# Patient Record
Sex: FEMALE
[De-identification: names, ages, dates, MRNs, and addresses within clinical notes are randomized per-mention and may not be internally consistent; named-entity substitution may affect disease eponyms.]

---

## 2018-04-01 ENCOUNTER — HOSPITAL ENCOUNTER (EMERGENCY)
Dept: HOSPITAL 31 - C.ER | Age: 68
Discharge: HOME | End: 2018-04-01
Payer: MEDICAID

## 2018-04-01 VITALS
TEMPERATURE: 98.1 F | DIASTOLIC BLOOD PRESSURE: 68 MMHG | SYSTOLIC BLOOD PRESSURE: 132 MMHG | RESPIRATION RATE: 18 BRPM | HEART RATE: 74 BPM

## 2018-04-01 VITALS — BODY MASS INDEX: 30.2 KG/M2

## 2018-04-01 VITALS — OXYGEN SATURATION: 98 %

## 2018-04-01 DIAGNOSIS — S09.90XA: Primary | ICD-10-CM
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
## 2018-04-01 DIAGNOSIS — S80.02XA: ICD-10-CM

## 2018-04-01 NOTE — RAD
PROCEDURE:  Left Knee Radiographs.



HISTORY:

Pain.



COMPARISON:

None.



FINDINGS:



BONES:

Normal. No fracture. 



JOINTS:

Tricompartmental degenerative osteoarthritis made with joint space 

narrowing subchondral sclerosis and prominent marginal medial 

osteophyte formation. There may be a small amount of vacuum phenomena 

within lateral compartment left knee 



JOINT EFFUSION:

Suspect trace joint effusion 



OTHER FINDINGS:

None.



IMPRESSION:

No evidence of acute displaced fracture nor dislocation.  

Tricompartmental DJD most notably affecting the medial compartment 

with suspected trace joint effusion.

## 2018-04-01 NOTE — C.PDOC
History Of Present Illness


67 year old female presents to the emergency department for evaluation of a 

head injury and knee injury after sustained mechanical fall early today at 8-9 

AM. Patient reports that she slipped and fell at home, hit her head on a chair. 

She reports a bump on the left side of her head and mild pain on the lateral 

aspect of  left knee with ambulation. Patient denies syncope, headache, 

dizziness, visual changes, focal deficits, neck pain, chest pain, abd. pain, N/V

, denies obvious  deformities, sensory or vascular deficits to B/L UEs and LEs  

extremities. Ambulate to Ed for evaluation, not in any apparent distress.


Time Seen by Provider: 04/01/18 12:10


Chief Complaint (Nursing): Headache


History Per: Patient


History/Exam Limitations: no limitations


Onset/Duration Of Symptoms: Hrs


Quality: Squeezing





Past Medical History


Reviewed: Historical Data, Nursing Documentation, Vital Signs


Vital Signs: 


 Last Vital Signs











Temp  98.1 F   04/01/18 13:20


 


Pulse  74   04/01/18 13:20


 


Resp  18   04/01/18 13:20


 


BP  132/68   04/01/18 13:20


 


Pulse Ox  98   04/01/18 13:37














- Medical History


PMH: Asthma, Diabetes


   Denies: Chronic Kidney Disease


Surgical History: Cholecystectomy


   Denies: Pacemaker





- [facility] Procedures








CARPAL TUNNEL RELEASE (06/26/15)


CORONAR ARTERIOGR-2 CATH (01/16/15)


LEFT HEART CARDIAC CATH (01/16/15)


LT HEART ANGIOCARDIOGRAM (01/16/15)


REMOVAL FB FROM HAND (03/19/14)








Family History: States: No Known Family Hx





- Social History


Hx Tobacco Use: No


Hx Alcohol Use: No


Hx Substance Use: No





- Immunization History


Hx Tetanus Toxoid Vaccination: Yes


Hx Influenza Vaccination: Yes


Hx Pneumococcal Vaccination: No





Review Of Systems


Except As Marked, All Systems Reviewed And Found Negative.


Cardiovascular: Negative for: Chest Pain, Other (headache, syncope)


Musculoskeletal: Negative for: Neck Pain


Skin: Positive for: Other (bumps left side of head)





Physical Exam





- Physical Exam


Appears: Well, Non-toxic, No Acute Distress


Skin: Normal Color, Warm, Dry, No Rash, No Ecchymosis


Head: Normacephalic, Tenderness (mild over left parietal scalp, no palpable 

defomrity.)


Eye(s): bilateral: PERRL, EOMI


Nose: No Flaring, No Discharge


Oral Mucosa: Moist


Neck: Trachea Midline, No Midline Cervical Tenderness, No Paracervical 

Tenderness, No Step Off Deformity, Supple


Chest: Symmetrical, No Deformity


Cardiovascular: Rhythm Regular, No Murmur


Respiratory: No Decreased Breath Sounds, No Accessory Muscle Use, No Stridor, 

No Wheezing


Gastrointestinal/Abdominal: Soft, No Tenderness, No Distention, No Guarding


Back: No CVA Tenderness, No Paraspinal Tenderness


Extremity: Normal ROM (B/L UEs and LEs), Tenderness (over lateral aspect left 

knee. No defomrity. FAROM, no neurovascular deficits.), No Deformity, No 

Swelling


Neurological/Psych: Oriented x3, Normal Speech, Normal Cognition, Normal Motor, 

Normal Sensation, Normal Reflexes





ED Course And Treatment


O2 Sat by Pulse Oximetry: 98


Pulse Ox Interpretation: Normal





- Other Rad


  ** Pelvis w/B;L hips


X-Ray: Interpreted by Me, Viewed By Me


Interpretation: (-) acute fx or dislocation





  ** Left knee


X-Ray: Interpreted by Me, Viewed By Me


Interpretation: (-) acute fx or dislocation





- CT Scan/US


  ** CT head w/o contars


Other Rad Studies (CT/US): Radiology Report Reviewed


CT/US Interpretation: Creator : Iban Whitman MD.  Dictator :   

:  Approver : Iban Whitman MD.  Approver2 :  Report Date : 04/01/2018 12:

54:09.  My Comment :  **********************************************************

*************************.  PROCEDURE:  CT HEAD WITHOUT CONTRAST.  HISTORY:  

injury.  COMPARISON:  None available.  TECHNIQUE:  Axial computed tomography 

images were obtained through the head/brain without intravenous contrast.  

Radiation dose:  Total exam DLP = 847.26 mGy-cm.  This CT exam was performed 

using one or more of the following dose reduction techniques: Automated 

exposure control, adjustment of the mA and/or kV according to patient size, and/

or use of iterative reconstruction technique.  FINDINGS:  HEMORRHAGE:  No acute 

parenchymal, subarachnoid nor extra-axial hemorrhage.  BRAIN:  Suspect minimal 

chronic periventricular white matter ischemic changes.  Mild age-appropriate 

volume loss.  VENTRICLES:  No obstructive hydrocephalus.  CALVARIUM:  

Unremarkable.  PARANASAL SINUSES:  Frontal sinuses are hypoplastic. The 

remaining visualized paranasal sinuses are otherwise relatively well-developed.

  MASTOID AIR CELLS:  Unremarkable as visualized. No inflammatory changes.  

OTHER FINDINGS:  None.  IMPRESSION:  No evidence of acute intracranial 

hemorrhage. Suspect minimal chronic periventricular white matter ischemic 

changes.


Progress Note: On re-eval, pt is afebrile, hemodynamicaly stable.  Non-toxic. 

Ambulatory in ED with stable giat.  Head: NC, mild contusion noted to left 

parietal scalp. No palpable deformity.  neck: Supple, (-) midline tenderness.  

Lungs: CTA B/L, BS equal B/L.  CVS: (+)S1S2, reg.  Abd: benign, (-) guarding, (-

) rebound.  FAROM of B/L UEs and LEs, no deformity, no ecchymoses.  

Neurologicaly intact.  Imaging review and appears normal, w/o acute 

abnormalities.  Pt has clinical findings c/w head injury, left knee contusion s/

p mechanical fall.  Pt advised.  ref. to f/u with PMD in 2-3 days for re-eavl.  

return to ED if any worsening or new changes





Disposition


Counseled Patient/Family Regarding: Studies Performed, Diagnosis, Need For 

Followup





- Disposition


Referrals: 


Kourtney Bocanegra MD [Family Provider] - 


Disposition: HOME/ ROUTINE


Disposition Time: 13:10


Condition: STABLE


Additional Instructions: 


Knee ace wrap for 1 week





OBSERVE 48 HRS FOR ANY SIGN OF HEAD INJURY-INTRACTABLE HEADACHE, VOMITING, 

VISUAL CHANGES OR ANY OTHER NEW CHANGES-RETURN TO ED IMMEDIATELY FOR RE-

EVALUATION.





Follow up with PMD in 1-2 days for re-evaluation.





Instructions:  Knee Sprain (DC), Minor Head Injury (DC)


Forms:  Shoot it! (English)


Print Language: Bengali





- Clinical Impression


Clinical Impression: 


 Head injury, Knee contusion








- PA / NP / Resident Statement


MD/DO has reviewed & agrees with the documentation as recorded.





- Scribe Statement


The provider has reviewed the documentation as recorded by the Scribe (James Hoang)


All medical record entries made by the Scribe were at my direction and 

personally dictated by me. I have reviewed the chart and agree that the record 

accurately reflects my personal performance of the history, physical exam, 

medical decision making, and the department course for this patient. I have 

also personally directed, reviewed, and agree with the discharge instructions 

and disposition.

## 2018-04-01 NOTE — CT
PROCEDURE:  CT HEAD WITHOUT CONTRAST.



HISTORY:

injury



COMPARISON:

None available. 



TECHNIQUE:

Axial computed tomography images were obtained through the head/brain 

without intravenous contrast.  



Radiation dose:



Total exam DLP = 847.26 mGy-cm.



This CT exam was performed using one or more of the following dose 

reduction techniques: Automated exposure control, adjustment of the 

mA and/or kV according to patient size, and/or use of iterative 

reconstruction technique.



FINDINGS:



HEMORRHAGE:

No acute parenchymal, subarachnoid nor extra-axial hemorrhage. 



BRAIN:

Suspect minimal chronic periventricular white matter ischemic changes.



Mild age-appropriate volume loss.



VENTRICLES:

No obstructive hydrocephalus. 



CALVARIUM:

Unremarkable.



PARANASAL SINUSES:

Frontal sinuses are hypoplastic. The remaining visualized paranasal 

sinuses are otherwise relatively well-developed.



MASTOID AIR CELLS:

Unremarkable as visualized. No inflammatory changes.



OTHER FINDINGS:

None.



IMPRESSION:

No evidence of acute intracranial hemorrhage. Suspect minimal chronic 

periventricular white matter ischemic changes.

## 2018-04-02 NOTE — RAD
PROCEDURE:  Radiographs of the pelvis and bilateral hips



HISTORY:

injury  



COMPARISON:

None.



FINDINGS:



BONES:

Pelvis: Unremarkable.



Right hip:Unremarkable.



Left hip:Unremarkable.



JOINTS:

Right hip: Mild osteoarthritis. No articular erosion.



Left hip: Mild osteoarthritis.  No articular erosion.



Sacroiliac Joints: Unremarkable.



Pubic symphysis: Unremarkable.



SOFT TISSUES:

Normal. 



OTHER FINDINGS:

None.



IMPRESSION:

Bilateral mild osteoarthritis. No acute fracture.